# Patient Record
Sex: FEMALE | Race: BLACK OR AFRICAN AMERICAN | NOT HISPANIC OR LATINO | URBAN - METROPOLITAN AREA
[De-identification: names, ages, dates, MRNs, and addresses within clinical notes are randomized per-mention and may not be internally consistent; named-entity substitution may affect disease eponyms.]

---

## 2021-08-28 ENCOUNTER — TELEPHONE ENCOUNTER (OUTPATIENT)
Dept: URBAN - METROPOLITAN AREA CLINIC 13 | Facility: CLINIC | Age: 81
End: 2021-08-28

## 2021-08-29 ENCOUNTER — TELEPHONE ENCOUNTER (OUTPATIENT)
Dept: URBAN - METROPOLITAN AREA CLINIC 13 | Facility: CLINIC | Age: 81
End: 2021-08-29

## 2022-09-19 ENCOUNTER — OFFICE VISIT (OUTPATIENT)
Dept: URBAN - METROPOLITAN AREA CLINIC 84 | Facility: CLINIC | Age: 82
End: 2022-09-19
Payer: MEDICARE

## 2022-09-19 ENCOUNTER — WEB ENCOUNTER (OUTPATIENT)
Dept: URBAN - METROPOLITAN AREA CLINIC 84 | Facility: CLINIC | Age: 82
End: 2022-09-19

## 2022-09-19 ENCOUNTER — DASHBOARD ENCOUNTERS (OUTPATIENT)
Age: 82
End: 2022-09-19

## 2022-09-19 VITALS
HEART RATE: 90 BPM | WEIGHT: 187.2 LBS | HEIGHT: 64 IN | BODY MASS INDEX: 31.96 KG/M2 | TEMPERATURE: 97.7 F | DIASTOLIC BLOOD PRESSURE: 73 MMHG | SYSTOLIC BLOOD PRESSURE: 114 MMHG

## 2022-09-19 DIAGNOSIS — R19.7 DIARRHEA, UNSPECIFIED: ICD-10-CM

## 2022-09-19 DIAGNOSIS — Z90.49 ACQUIRED ABSENCE OF OTHER SPECIFIED PARTS OF DIGESTIVE TRACT: ICD-10-CM

## 2022-09-19 DIAGNOSIS — D50.9 IRON DEFICIENCY ANEMIA, UNSPECIFIED IRON DEFICIENCY ANEMIA TYPE: ICD-10-CM

## 2022-09-19 PROBLEM — 87522002: Status: ACTIVE | Noted: 2022-09-19

## 2022-09-19 PROBLEM — 84410009: Status: ACTIVE | Noted: 2022-09-19

## 2022-09-19 PROCEDURE — 99214 OFFICE O/P EST MOD 30 MIN: CPT | Performed by: INTERNAL MEDICINE

## 2022-09-19 RX ORDER — NITROFURANTOIN MONOHYDRATE/MACROCRYSTALLINE 25; 75 MG/1; MG/1
CAPSULE ORAL
Qty: 14 CAPSULE | Status: ON HOLD | COMMUNITY

## 2022-09-19 RX ORDER — POLYETHYLENE GLYCOL 3350 17 G/17G
AS DIRECTED POWDER, FOR SOLUTION ORAL ONCE A DAY
Qty: 238 GRAM | Refills: 0 | OUTPATIENT
Start: 2022-09-19 | End: 2022-09-20

## 2022-09-19 RX ORDER — TRAZODONE HYDROCHLORIDE 150 MG/1
TAKE 1/2 TO 1 TABLET BY MOUTH EVERY DAY AT BEDTIME AS NEEDED TABLET ORAL
Qty: 90 EACH | Refills: 0 | Status: ON HOLD | COMMUNITY

## 2022-09-19 NOTE — HPI-TODAY'S VISIT:
81 yo pt presents for evaluation of anemia after recent flu-like illness. Thus, referred for evaluation. Prior colonoscopy was many years ago. No overt bleeding.

## 2022-09-21 ENCOUNTER — OFFICE VISIT (OUTPATIENT)
Dept: URBAN - METROPOLITAN AREA SURGERY CENTER 20 | Facility: SURGERY CENTER | Age: 82
End: 2022-09-21
Payer: MEDICARE

## 2022-09-21 DIAGNOSIS — D50.9 ANEMIA: ICD-10-CM

## 2022-09-21 PROCEDURE — G8907 PT DOC NO EVENTS ON DISCHARG: HCPCS | Performed by: INTERNAL MEDICINE

## 2022-09-21 PROCEDURE — 45378 DIAGNOSTIC COLONOSCOPY: CPT | Performed by: INTERNAL MEDICINE

## 2022-09-21 RX ORDER — TRAZODONE HYDROCHLORIDE 150 MG/1
TAKE 1/2 TO 1 TABLET BY MOUTH EVERY DAY AT BEDTIME AS NEEDED TABLET ORAL
Qty: 90 EACH | Refills: 0 | Status: ON HOLD | COMMUNITY

## 2022-09-21 RX ORDER — NITROFURANTOIN MONOHYDRATE/MACROCRYSTALLINE 25; 75 MG/1; MG/1
CAPSULE ORAL
Qty: 14 CAPSULE | Status: ON HOLD | COMMUNITY

## 2022-09-29 ENCOUNTER — OUT OF OFFICE VISIT (OUTPATIENT)
Dept: URBAN - METROPOLITAN AREA MEDICAL CENTER 24 | Facility: MEDICAL CENTER | Age: 82
End: 2022-09-29
Payer: MEDICARE

## 2022-09-29 DIAGNOSIS — D50.9 ANEMIA: ICD-10-CM

## 2022-09-29 PROCEDURE — G8427 DOCREV CUR MEDS BY ELIG CLIN: HCPCS | Performed by: PHYSICIAN ASSISTANT

## 2022-09-29 PROCEDURE — 99222 1ST HOSP IP/OBS MODERATE 55: CPT | Performed by: PHYSICIAN ASSISTANT

## 2022-09-30 ENCOUNTER — OUT OF OFFICE VISIT (OUTPATIENT)
Dept: URBAN - METROPOLITAN AREA MEDICAL CENTER 24 | Facility: MEDICAL CENTER | Age: 82
End: 2022-09-30
Payer: MEDICARE

## 2022-09-30 DIAGNOSIS — Z79.01 ANTICOAGULANT LONG-TERM USE: ICD-10-CM

## 2022-09-30 DIAGNOSIS — D50.9 ANEMIA: ICD-10-CM

## 2022-09-30 PROCEDURE — 99232 SBSQ HOSP IP/OBS MODERATE 35: CPT | Performed by: PHYSICIAN ASSISTANT

## 2022-10-27 ENCOUNTER — TELEPHONE ENCOUNTER (OUTPATIENT)
Dept: URBAN - METROPOLITAN AREA CLINIC 84 | Facility: CLINIC | Age: 82
End: 2022-10-27